# Patient Record
Sex: MALE | Race: WHITE | NOT HISPANIC OR LATINO | Employment: FULL TIME | URBAN - METROPOLITAN AREA
[De-identification: names, ages, dates, MRNs, and addresses within clinical notes are randomized per-mention and may not be internally consistent; named-entity substitution may affect disease eponyms.]

---

## 2020-11-30 ENCOUNTER — OFFICE VISIT (OUTPATIENT)
Dept: FAMILY MEDICINE | Facility: CLINIC | Age: 40
End: 2020-11-30
Payer: COMMERCIAL

## 2020-11-30 VITALS
WEIGHT: 172.2 LBS | OXYGEN SATURATION: 98 % | BODY MASS INDEX: 24.65 KG/M2 | RESPIRATION RATE: 16 BRPM | SYSTOLIC BLOOD PRESSURE: 120 MMHG | TEMPERATURE: 97.9 F | DIASTOLIC BLOOD PRESSURE: 76 MMHG | HEART RATE: 83 BPM | HEIGHT: 70 IN

## 2020-11-30 DIAGNOSIS — Z13.220 SCREENING CHOLESTEROL LEVEL: ICD-10-CM

## 2020-11-30 DIAGNOSIS — K92.1 BLOOD IN STOOL: ICD-10-CM

## 2020-11-30 DIAGNOSIS — R10.11 RUQ ABDOMINAL PAIN: ICD-10-CM

## 2020-11-30 DIAGNOSIS — R07.9 CHEST PAIN, UNSPECIFIED TYPE: Primary | ICD-10-CM

## 2020-11-30 DIAGNOSIS — Z13.1 SCREENING FOR DIABETES MELLITUS: ICD-10-CM

## 2020-11-30 DIAGNOSIS — F43.21 SITUATIONAL DEPRESSION: ICD-10-CM

## 2020-11-30 LAB
ALBUMIN SERPL-MCNC: 4.3 G/DL (ref 3.4–5)
ALP SERPL-CCNC: 61 U/L (ref 40–150)
ALT SERPL W P-5'-P-CCNC: 33 U/L (ref 0–70)
ANION GAP SERPL CALCULATED.3IONS-SCNC: 7 MMOL/L (ref 3–14)
AST SERPL W P-5'-P-CCNC: 16 U/L (ref 0–45)
BASOPHILS # BLD AUTO: 0 10E9/L (ref 0–0.2)
BASOPHILS NFR BLD AUTO: 0.2 %
BILIRUB SERPL-MCNC: 0.7 MG/DL (ref 0.2–1.3)
BUN SERPL-MCNC: 7 MG/DL (ref 7–30)
CALCIUM SERPL-MCNC: 9.4 MG/DL (ref 8.5–10.1)
CHLORIDE SERPL-SCNC: 105 MMOL/L (ref 94–109)
CHOLEST SERPL-MCNC: 157 MG/DL
CO2 SERPL-SCNC: 25 MMOL/L (ref 20–32)
CREAT SERPL-MCNC: 0.8 MG/DL (ref 0.66–1.25)
DIFFERENTIAL METHOD BLD: NORMAL
EOSINOPHIL # BLD AUTO: 0.1 10E9/L (ref 0–0.7)
EOSINOPHIL NFR BLD AUTO: 0.8 %
ERYTHROCYTE [DISTWIDTH] IN BLOOD BY AUTOMATED COUNT: 11.9 % (ref 10–15)
GFR SERPL CREATININE-BSD FRML MDRD: >90 ML/MIN/{1.73_M2}
GLUCOSE SERPL-MCNC: 91 MG/DL (ref 70–99)
HCT VFR BLD AUTO: 46.7 % (ref 40–53)
HDLC SERPL-MCNC: 50 MG/DL
HGB BLD-MCNC: 15.7 G/DL (ref 13.3–17.7)
LDLC SERPL CALC-MCNC: 83 MG/DL
LYMPHOCYTES # BLD AUTO: 1.7 10E9/L (ref 0.8–5.3)
LYMPHOCYTES NFR BLD AUTO: 18.3 %
MCH RBC QN AUTO: 32.2 PG (ref 26.5–33)
MCHC RBC AUTO-ENTMCNC: 33.6 G/DL (ref 31.5–36.5)
MCV RBC AUTO: 96 FL (ref 78–100)
MONOCYTES # BLD AUTO: 0.8 10E9/L (ref 0–1.3)
MONOCYTES NFR BLD AUTO: 8.8 %
NEUTROPHILS # BLD AUTO: 6.5 10E9/L (ref 1.6–8.3)
NEUTROPHILS NFR BLD AUTO: 71.9 %
NONHDLC SERPL-MCNC: 107 MG/DL
PLATELET # BLD AUTO: 308 10E9/L (ref 150–450)
POTASSIUM SERPL-SCNC: 3.8 MMOL/L (ref 3.4–5.3)
PROT SERPL-MCNC: 7.4 G/DL (ref 6.8–8.8)
RBC # BLD AUTO: 4.87 10E12/L (ref 4.4–5.9)
SODIUM SERPL-SCNC: 137 MMOL/L (ref 133–144)
TRIGL SERPL-MCNC: 118 MG/DL
WBC # BLD AUTO: 9 10E9/L (ref 4–11)

## 2020-11-30 PROCEDURE — 99203 OFFICE O/P NEW LOW 30 MIN: CPT | Performed by: FAMILY MEDICINE

## 2020-11-30 PROCEDURE — 80061 LIPID PANEL: CPT | Performed by: FAMILY MEDICINE

## 2020-11-30 PROCEDURE — 85025 COMPLETE CBC W/AUTO DIFF WBC: CPT | Performed by: FAMILY MEDICINE

## 2020-11-30 PROCEDURE — 93000 ELECTROCARDIOGRAM COMPLETE: CPT | Performed by: FAMILY MEDICINE

## 2020-11-30 PROCEDURE — 36415 COLL VENOUS BLD VENIPUNCTURE: CPT | Performed by: FAMILY MEDICINE

## 2020-11-30 PROCEDURE — 80053 COMPREHEN METABOLIC PANEL: CPT | Performed by: FAMILY MEDICINE

## 2020-11-30 ASSESSMENT — PATIENT HEALTH QUESTIONNAIRE - PHQ9: SUM OF ALL RESPONSES TO PHQ QUESTIONS 1-9: 19

## 2020-11-30 ASSESSMENT — MIFFLIN-ST. JEOR: SCORE: 1693.37

## 2020-11-30 NOTE — PROGRESS NOTES
Subjective     Tr Cuenca is a 40 year old male who presents to clinic today for the following health issues:    HPI   Abdominal/Flank Pain  Onset/Duration: 4 years for abdominal pain, blood in stool for 10 years  Description:   Character: Dull ache all the time and Stabbing occasional  Location: right upper quadrant  Radiation: None  Intensity: 5/10  Progression of Symptoms:  same  Accompanying Signs & Symptoms:  Fever/Chills: no  Gas/Bloating: no  Nausea: no  Vomitting: no  Diarrhea: YES  Constipation: no  Dysuria or Hematuria: no  Has had blood in the stool, bright red blood, occurs occasional, not with every bowel movement, does have some dark stools, blood on the toilet paper and in the stool, no rectal pain.    History:   Trauma: no  Previous similar pain: ongoing issue  Previous tests done: none  Precipitating factors:   Does the pain change with:     Food: no    Bowel Movement: no    Urination: no   Other factors:  no  Therapies tried and outcome: None    - Reports having blood incorporated in the stool over the last 10 years. Also blood in the toilet. This has since stopped after he stopped drinking alcohol, was drinking 4-6 beers nightly. This is his first time being evaluated for this. No family history of early cancer.     Chest Pain  Onset/Duration: 2 years, constant  Description:   Location: left side  Character: sharp and achey  Radiation: down left arm  Duration: constant   Intensity: 5/10  Progression of Symptoms: worsening  Accompanying Signs & Symptoms:  Shortness of breath: no  Sweating: YES- did have one episode about 1 year ago while raking leaves  Nausea/vomiting: no  Lightheadedness: no  Palpitations: maybe  Fever/Chills: no  Cough: no           Heartburn: unsure  History:   Family history of heart disease: YES- mother side  Tobacco use: uses e cig currently, was previous smoker  Previous similar symptoms: YES- this has been ongoing for 2 years  Precipitating factors:   Worse with  "exertion: no  Worse with deep breaths: no           Related to eating: no           Better with burping: no  Alleviating factors: none  Therapies tried and outcome: none    - Is not associated with activity. Is constant. Is reproducible with palpation over the chest wall.     MOOD:  -Patient reports that his wife recently left him 2 weeks ago.  He also has 2 boys ages 4 and 2.  He is willing to try counseling, and marriage therapy however his wife is not interested at this time.  Has been very difficult and he has had decreased appetite over the last few weeks.  He is not currently seeing a mental health therapist.  He is going to anger management and also has stopped drinking alcohol.  He is not interested in starting medication at this time for his mental health but is willing to meet with a therapist.    Review of Systems   Constitutional, HEENT, cardiovascular, pulmonary, gi and gu systems are negative, except as otherwise noted.      Objective    /76 (BP Location: Left arm, Patient Position: Chair, Cuff Size: Adult Regular)   Pulse 83   Temp 97.9  F (36.6  C) (Tympanic)   Resp 16   Ht 1.772 m (5' 9.75\")   Wt 78.1 kg (172 lb 3.2 oz)   SpO2 98%   BMI 24.89 kg/m    Body mass index is 24.89 kg/m .  Physical Exam   General: alert, cooperative, no acute distress   HEENT: NC/ AT, PERRL  CV: RRR, no murmur  Chest wall: Patient tender palpation to the left pectoralis major muscle.  Resp: non-labored breathing, clear to auscultation, no wheezing or rales   Abdomen: Soft, tender to palpation right upper quadrant.  Meneses sign positive.  No rebound or guarding.  Extremities: No peripheral edema, calves non-tender.           Assessment & Plan     Tr was seen today for rectal problem, abdominal pain and chest pain.    Diagnoses and all orders for this visit:    Chest pain, unspecified type  Musculoskeletal chest pain.  -Pain is not worsened with activity.  Pain is reproducible with palpation of the left " pectoralis major muscle.  No issues with breathing.  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 Will obtain baseline ECG as patient is concerned as uncle with heart attack and had chest pain.   -     EKG 12-lead complete w/read - Clinics  -     CBC with platelets differential    RUQ abdominal pain  Blood in stool   -Reports blood in his stool for the past 10 years.  He was drinking 4-6 beers nightly.  Since he has stopped drinking 3 weeks ago he no longer had any episodes of blood in his stool.  I do believe that alcohol was likely a culprit.  Is reassuring that he no longer has any blood in his stool.  Initial evaluation with a CBC, CMP and also ultrasound the right upper quadrant due to positive Meneses sign and right upper quadrant pain. He will follow up with me in 1 week to discuss labs and imaging. Will further discuss the need for a colonoscopy.   -     Comprehensive metabolic panel  -     US Abdomen Limited; Future    Situational depression  -His wife recently left him and also took his 2 children.  He is currently quite depressed.  He is not interested in medication at this time.  He is willing to proceed with mental health therapy and this was ordered today.  No plan to attempt suicide.  Safety plan discussed.  Follow up with myself in 1 week or sooner if needed.  Reasons to present to the emergency department discussed.  PHQ-9 score:    PHQ 11/30/2020   PHQ-9 Total Score 19   Q9: Thoughts of better off dead/self-harm past 2 weeks Several days     -     MENTAL HEALTH REFERRAL  - Adult; Outpatient Treatment; Individual/Couples/Family/Group Therapy/Health Psychology; [facility] [phone]; We will contact you to schedule the appointment or please call with any questions    Screening cholesterol level  - Has never been screened. Will obtain screening today.   -     Lipid panel reflex to direct LDL Fasting        Follow up in 1 week.     [facility]  [facility]

## 2020-11-30 NOTE — RESULT ENCOUNTER NOTE
Please call patient     ECG, cholesterol, CBC ( blood count), CMP ( abdominal labs) has returned satisfactory and within normal range. Continue with plan for follow up in 1 week.     Thanks

## 2020-12-07 ENCOUNTER — OFFICE VISIT (OUTPATIENT)
Dept: FAMILY MEDICINE | Facility: CLINIC | Age: 40
End: 2020-12-07
Payer: COMMERCIAL

## 2020-12-07 VITALS
TEMPERATURE: 96.9 F | BODY MASS INDEX: 24.34 KG/M2 | OXYGEN SATURATION: 98 % | HEART RATE: 83 BPM | SYSTOLIC BLOOD PRESSURE: 100 MMHG | DIASTOLIC BLOOD PRESSURE: 60 MMHG | HEIGHT: 70 IN | WEIGHT: 170 LBS | RESPIRATION RATE: 16 BRPM

## 2020-12-07 DIAGNOSIS — R10.9 FLANK PAIN: Primary | ICD-10-CM

## 2020-12-07 DIAGNOSIS — F43.21 SITUATIONAL DEPRESSION: ICD-10-CM

## 2020-12-07 DIAGNOSIS — F41.8 SITUATIONAL ANXIETY: ICD-10-CM

## 2020-12-07 PROCEDURE — 99213 OFFICE O/P EST LOW 20 MIN: CPT | Performed by: FAMILY MEDICINE

## 2020-12-07 ASSESSMENT — PATIENT HEALTH QUESTIONNAIRE - PHQ9
5. POOR APPETITE OR OVEREATING: SEVERAL DAYS
SUM OF ALL RESPONSES TO PHQ QUESTIONS 1-9: 10

## 2020-12-07 ASSESSMENT — ANXIETY QUESTIONNAIRES
1. FEELING NERVOUS, ANXIOUS, OR ON EDGE: SEVERAL DAYS
3. WORRYING TOO MUCH ABOUT DIFFERENT THINGS: MORE THAN HALF THE DAYS
IF YOU CHECKED OFF ANY PROBLEMS ON THIS QUESTIONNAIRE, HOW DIFFICULT HAVE THESE PROBLEMS MADE IT FOR YOU TO DO YOUR WORK, TAKE CARE OF THINGS AT HOME, OR GET ALONG WITH OTHER PEOPLE: SOMEWHAT DIFFICULT
5. BEING SO RESTLESS THAT IT IS HARD TO SIT STILL: SEVERAL DAYS
7. FEELING AFRAID AS IF SOMETHING AWFUL MIGHT HAPPEN: MORE THAN HALF THE DAYS
6. BECOMING EASILY ANNOYED OR IRRITABLE: NOT AT ALL
2. NOT BEING ABLE TO STOP OR CONTROL WORRYING: NEARLY EVERY DAY
GAD7 TOTAL SCORE: 10

## 2020-12-07 ASSESSMENT — MIFFLIN-ST. JEOR: SCORE: 1683.39

## 2020-12-07 NOTE — PROGRESS NOTES
"Subjective     Tr Cuenca is a 40 year old male who presents to clinic today for the following health issues:    HPI     Abdominal/flank pain:  Discussed most recent lab results with a normal CMP.  Patient is scheduled to get ultrasound of right upper quadrant looking for gallbladder etiology in the next few days.  Continues to have intermittent pain in the right upper quadrant.  No pain at today's visit.  No blood in the stool.     Chest pain:  Most recent ECG returned satisfactory.  Denies any chest pain at visit.  Denies any palpitations.  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     Mood:  Patient continues to have mood concerns.  He is in a tough situation for his wife left him with [place] boys [place] another albert.  Legal papers were filed on Tuesday his wife wants full custody of the children, he has been in contact with  and he will be filing for the same.  He reports that he is taking it day by day but it is very tough on him.  He is currently seeing anger management therapist, and a marriage therapist through the [facility].  He also reached out to a friend of his who does QNRT ( [employer]um neuro reset therapy) and going to see her in a couple days. His wife still does not want to pursue any couples therapy at this time. His PHQ 9 score decreased from 19 one week ago to 10 today.  He continues to have suicidal ideation.  He states that he would never do this especially with his boys.  He voices safety plan today.  Discussed with patient role of medications and he is still resistant to starting medications at this time.  No alcohol or substance abuse.    Review of Systems   Constitutional, HEENT, cardiovascular, pulmonary, gi and gu systems are negative, except as otherwise noted.      Objective    /60 (BP Location: Left arm, Patient Position: Chair, Cuff Size: Adult Regular)   Pulse 83   Temp 96.9  F (36.1  C) (Tympanic)   Resp 16   Ht 1.772 m (5' 9.75\")   Wt 77.1 kg (170 " lb)   SpO2 98%   BMI 24.57 kg/m    Body mass index is 24.57 kg/m .  Physical Exam   General: alert, cooperative   CV: RRR, no murmur  Resp: non-labored breathing  Abdomen: Soft, non-tender, no guarding.   Psych: mood seems down. No tangential thoughts. Good eye contact. Tearful at times. No hallucinations.           Assessment & Plan     Tr was seen today for abdominal pain.    Diagnoses and all orders for this visit:    Situational depression  -Continues to have situational depression which I am not surprised by given his current situation.   -He is currently seeing a therapist to [facility], going to anger management, and is now going to see a QNRT ( quantau neuro reset therapy) therapist.   - Continues to decline any medications at this time.   - On response stated positive for Q9. Discussed this with patient. He has no plan. He would never hurt himself or anyone else. Discussed if these thoughts become overwhelming he will present to the ED or call 911.   - follow up with myself in 2 weeks.     Situational anxiety  SANDOVAL 7 score of 10. Not interested in medication. Going through therapy at this time.     Flank Pain  No current flank pain.  Most recent CMP results satisfactory.  Scheduled for ultrasound in a few days.  His pain could be related to a cholelithiasis type picture    Follow up in 2 weeks with myself.         Depression Screening Follow Up    PHQ 12/7/2020   PHQ-9 Total Score 10   Q9: Thoughts of better off dead/self-harm past 2 weeks More than half the days     Follow Up    Discussed the following ways the patient can remain in a safe environment:  remove alcohol and remove drugs Call 911, present to ED.     Russ Chambers DO  [facility]

## 2020-12-07 NOTE — PATIENT INSTRUCTIONS
Lab work looks great     Follow up in 2 weeks.         Thank you for choosing [facility].  You may be receiving an email and/or telephone survey request from [facility] Customer Experience regarding your visit today.  Please take a few minutes to respond to the survey to let us know how we are doing.      If you have questions or concerns, please contact us via [email or web address] or you can contact your care team at [phone].    Our Clinic hours are:  Monday 6:40 am  to 7:00 pm  Tuesday -Friday 6:40 am to 5:00 pm    The [place] outpatient lab hours are:  Monday - Friday 6:10 am to 4:45 pm  Saturdays 7:00 am to 11:00 am  Appointments are required, call [phone]    If you have clinical questions after hours or would like to schedule an appointment,  call the clinic at [phone].

## 2020-12-08 ASSESSMENT — ANXIETY QUESTIONNAIRES: GAD7 TOTAL SCORE: 10

## 2020-12-09 ENCOUNTER — HOSPITAL ENCOUNTER (OUTPATIENT)
Dept: ULTRASOUND IMAGING | Facility: CLINIC | Age: 40
Discharge: HOME | End: 2020-12-09
Attending: FAMILY MEDICINE | Admitting: FAMILY MEDICINE
Payer: COMMERCIAL

## 2020-12-09 DIAGNOSIS — R10.11 RUQ ABDOMINAL PAIN: ICD-10-CM

## 2020-12-09 PROCEDURE — 76705 ECHO EXAM OF ABDOMEN: CPT

## 2020-12-09 NOTE — RESULT ENCOUNTER NOTE
Please call patient     Most recent US shows no gallstones and was otherwise within normal limits.

## 2020-12-21 ENCOUNTER — OFFICE VISIT (OUTPATIENT)
Dept: FAMILY MEDICINE | Facility: CLINIC | Age: 40
End: 2020-12-21
Payer: COMMERCIAL

## 2020-12-21 VITALS
TEMPERATURE: 96.7 F | SYSTOLIC BLOOD PRESSURE: 100 MMHG | WEIGHT: 173 LBS | RESPIRATION RATE: 16 BRPM | HEART RATE: 70 BPM | OXYGEN SATURATION: 97 % | DIASTOLIC BLOOD PRESSURE: 50 MMHG | BODY MASS INDEX: 24.77 KG/M2 | HEIGHT: 70 IN

## 2020-12-21 DIAGNOSIS — K64.9 HEMORRHOIDS, UNSPECIFIED HEMORRHOID TYPE: ICD-10-CM

## 2020-12-21 DIAGNOSIS — F41.8 SITUATIONAL ANXIETY: ICD-10-CM

## 2020-12-21 DIAGNOSIS — F43.21 SITUATIONAL DEPRESSION: Primary | ICD-10-CM

## 2020-12-21 PROCEDURE — 99213 OFFICE O/P EST LOW 20 MIN: CPT | Performed by: FAMILY MEDICINE

## 2020-12-21 RX ORDER — BENZOCAINE/MENTHOL 6 MG-10 MG
LOZENGE MUCOUS MEMBRANE 2 TIMES DAILY
Qty: 45 G | Refills: 3 | Status: SHIPPED | OUTPATIENT
Start: 2020-12-21 | End: 2021-01-11

## 2020-12-21 ASSESSMENT — PATIENT HEALTH QUESTIONNAIRE - PHQ9
SUM OF ALL RESPONSES TO PHQ QUESTIONS 1-9: 7
5. POOR APPETITE OR OVEREATING: SEVERAL DAYS

## 2020-12-21 ASSESSMENT — ANXIETY QUESTIONNAIRES
5. BEING SO RESTLESS THAT IT IS HARD TO SIT STILL: SEVERAL DAYS
6. BECOMING EASILY ANNOYED OR IRRITABLE: SEVERAL DAYS
1. FEELING NERVOUS, ANXIOUS, OR ON EDGE: SEVERAL DAYS
GAD7 TOTAL SCORE: 8
7. FEELING AFRAID AS IF SOMETHING AWFUL MIGHT HAPPEN: SEVERAL DAYS
3. WORRYING TOO MUCH ABOUT DIFFERENT THINGS: SEVERAL DAYS
IF YOU CHECKED OFF ANY PROBLEMS ON THIS QUESTIONNAIRE, HOW DIFFICULT HAVE THESE PROBLEMS MADE IT FOR YOU TO DO YOUR WORK, TAKE CARE OF THINGS AT HOME, OR GET ALONG WITH OTHER PEOPLE: SOMEWHAT DIFFICULT
2. NOT BEING ABLE TO STOP OR CONTROL WORRYING: MORE THAN HALF THE DAYS

## 2020-12-21 ASSESSMENT — MIFFLIN-ST. JEOR: SCORE: 1697

## 2020-12-21 NOTE — PATIENT INSTRUCTIONS
Continue with current cares.     Preparation H twice daily.     Follow up in 3-4 weeks.       Thank you for choosing [facility].  You may be receiving an email and/or telephone survey request from [facility] Customer Experience regarding your visit today.  Please take a few minutes to respond to the survey to let us know how we are doing.      If you have questions or concerns, please contact us via [employer] or you can contact your care team at [phone].    Our Clinic hours are:  Monday 6:40 am  to 7:00 pm  Tuesday -Friday 6:40 am to 5:00 pm    The [place] outpatient lab hours are:  Monday - Friday 6:10 am to 4:45 pm  Saturdays 7:00 am to 11:00 am  Appointments are required, call [phone]    If you have clinical questions after hours or would like to schedule an appointment,  call the clinic at [phone].

## 2020-12-21 NOTE — PROGRESS NOTES
"Subjective     Tr Cuenca is a 40 year old male who presents to clinic today for the following health issues:    HPI     Mood:  Mood has improved since last visit a couple weeks ago.  He continues to proceed with QNRT  therapy which he reports is going well.  Continues to have marital discord and will proceed with legal garcía with his current wife for child custody.  Continues to avoid alcohol and substances.  He reports that time has helped but is still in a tough situation.  He continues to see his children from time to time.      Blood in stool:  Patient reports that he has had blood when he wipes after a bowel movement and also occasionally in the toilet bowl, this will occur about every 3rd bowel movement. No changes in stool pattern. Blood is not incorporated into the stool. No pain with bowel movements. No straining.  No fevers or chills. No nausea or vomiting.  No unexpected weight loss.  He had prior episodes of blood in the stool when drinking alcohol however this subsided with cessation of alcohol.   No family history of early colon cancer.  Has never underwent a colonoscopy.             Review of Systems   Constitutional: Negative fevers   HEENT: Negative vision changes, hearing changes, difficulty with swallowing.  CV: Negative chest pain   Resp: Negative shortness of breath, significant cough, wheezing  GI: Negative nausea, vomiting, diarrhea, constipation, blood in stool        Objective    /50 (BP Location: Left arm, Patient Position: Chair, Cuff Size: Adult Regular)   Pulse 70   Temp 96.7  F (35.9  C) (Tympanic)   Resp 16   Ht 1.772 m (5' 9.75\")   Wt 78.5 kg (173 lb)   SpO2 97%   BMI 25.00 kg/m    Body mass index is 25 kg/m .  Physical Exam   General: alert, cooperative, no acute distress   CV: RRR, no murmur  Resp: non-labored breathing, clear to auscultation, no wheezing or rales   Abdomen: Soft, mild tenderness RLQ. No rebound or guarding. Meneses sign negative.   Rectal: exam " "deferred by patient.   Psych: Mood appropriate.  No tangential thoughts.  Affect seems improved since last visit.           Assessment & Plan     Tr was seen today for anxiety and depression.    Diagnoses and all orders for this visit:    Situational depression  Situational anxiety  -PHQ-9 score of 7, SANDOVAL-7 score of 8.  Mood is improved. No substance abuse. Defers any medication or other therapy. Will continue with QNRT therapy. To follow-up in 2 to 3 weeks. Will see sooner if needed.     Hemorrhoids, unspecified hemorrhoid type  - Bright red blood when wipes. Not incorporated into the stool. Will trial steroid cream to see if improvement. If no improvement at follow up visit will proceed with rectal exam and also likely colonoscopy.   -     hydrocortisone (CORTAID) 1 % external cream; Apply topically 2 times daily         BMI:   Estimated body mass index is 25 kg/m  as calculated from the following:    Height as of this encounter: 1.772 m (5' 9.75\").    Weight as of this encounter: 78.5 kg (173 lb).        Follow up in 2-3 weeks.     Russ Chambers, [facility]      "

## 2020-12-22 ASSESSMENT — ANXIETY QUESTIONNAIRES: GAD7 TOTAL SCORE: 8

## 2021-01-15 ENCOUNTER — HEALTH MAINTENANCE LETTER (OUTPATIENT)
Age: 41
End: 2021-01-15

## 2021-01-19 ENCOUNTER — VIRTUAL VISIT (OUTPATIENT)
Dept: PSYCHOLOGY | Facility: CLINIC | Age: 41
End: 2021-01-19
Attending: FAMILY MEDICINE
Payer: COMMERCIAL

## 2021-01-19 DIAGNOSIS — Z03.89 NO DIAGNOSIS ON AXIS I: Primary | ICD-10-CM

## 2021-01-19 NOTE — PROGRESS NOTES
Patient no showed to the session.  Contacted patient and left voicemail with contact information.

## 2021-01-19 NOTE — Clinical Note
Hello,     Just updating in regards of patient. We were set up for therapy appointment this morning.  Patient no showed, called twice and left voicemail with contact information.  Will follow up soon.

## 2021-02-15 ENCOUNTER — OFFICE VISIT (OUTPATIENT)
Dept: FAMILY MEDICINE | Facility: CLINIC | Age: 41
End: 2021-02-15
Payer: COMMERCIAL

## 2021-02-15 VITALS
OXYGEN SATURATION: 99 % | TEMPERATURE: 97 F | HEIGHT: 70 IN | WEIGHT: 172 LBS | RESPIRATION RATE: 16 BRPM | HEART RATE: 64 BPM | SYSTOLIC BLOOD PRESSURE: 104 MMHG | DIASTOLIC BLOOD PRESSURE: 60 MMHG | BODY MASS INDEX: 24.62 KG/M2

## 2021-02-15 DIAGNOSIS — F41.8 SITUATIONAL ANXIETY: ICD-10-CM

## 2021-02-15 DIAGNOSIS — F43.21 SITUATIONAL DEPRESSION: Primary | ICD-10-CM

## 2021-02-15 PROCEDURE — 99213 OFFICE O/P EST LOW 20 MIN: CPT | Performed by: FAMILY MEDICINE

## 2021-02-15 ASSESSMENT — ANXIETY QUESTIONNAIRES
6. BECOMING EASILY ANNOYED OR IRRITABLE: SEVERAL DAYS
GAD7 TOTAL SCORE: 4
2. NOT BEING ABLE TO STOP OR CONTROL WORRYING: SEVERAL DAYS
5. BEING SO RESTLESS THAT IT IS HARD TO SIT STILL: NOT AT ALL
1. FEELING NERVOUS, ANXIOUS, OR ON EDGE: SEVERAL DAYS
7. FEELING AFRAID AS IF SOMETHING AWFUL MIGHT HAPPEN: SEVERAL DAYS
3. WORRYING TOO MUCH ABOUT DIFFERENT THINGS: NOT AT ALL
IF YOU CHECKED OFF ANY PROBLEMS ON THIS QUESTIONNAIRE, HOW DIFFICULT HAVE THESE PROBLEMS MADE IT FOR YOU TO DO YOUR WORK, TAKE CARE OF THINGS AT HOME, OR GET ALONG WITH OTHER PEOPLE: NOT DIFFICULT AT ALL

## 2021-02-15 ASSESSMENT — PATIENT HEALTH QUESTIONNAIRE - PHQ9
5. POOR APPETITE OR OVEREATING: NOT AT ALL
SUM OF ALL RESPONSES TO PHQ QUESTIONS 1-9: 3

## 2021-02-15 ASSESSMENT — MIFFLIN-ST. JEOR: SCORE: 1695.41

## 2021-02-15 NOTE — PROGRESS NOTES
Assessment & Plan     Situational depression  Situational anxiety  - Overall is improving at this time. His wife has now moved back in with him and his children. Not going to pursue divorce but going to try and work through things. Currently going to group therapy with his wife. Declines any mood medication. No alcohol use.   Follow up in 1 month or sooner if needed.     Russ Chambers, [facility]    Ricky Armando is a 41 year old who presents for the following health issues     HPI   41 year old male who follows up for mood.    Anxiety Follow-Up    How are you doing with your anxiety since your last visit? Improved     Are you having other symptoms that might be associated with anxiety? No    Have you had a significant life event? Relationship Concerns     Are you feeling depressed? Yes:  little    Do you have any concerns with your use of alcohol or other drugs? No     Patient reports that he is doing better since last visit.  His wife has now moved back in.  They have decided to forego any divorce.  They are currently going through group therapy.  He is encouraging his wife to go to counseling individually as well.  He is happy with improvement over the last month.  Continues to decline any medications for help with mood. Continues to avoid any alcohol.     Social History     Tobacco Use     Smoking status: Former Smoker     Smokeless tobacco: Never Used     Tobacco comment: does use e cig   Substance Use Topics     Alcohol use: Not Currently     Drug use: Yes     Types: Marijuana     Comment: occasional     SANDOVAL-7 SCORE 12/21/2020 1/11/2021 2/15/2021   Total Score 8 7 4     PHQ 12/21/2020 1/11/2021 2/15/2021   PHQ-9 Total Score 7 7 3   Q9: Thoughts of better off dead/self-harm past 2 weeks Not at all Not at all Not at all         Review of Systems   Negative except as noted in HPI      Objective    /60 (BP Location: Left arm, Patient Position: Chair, Cuff Size: Adult  "Regular)   Pulse 64   Temp 97  F (36.1  C) (Tympanic)   Resp 16   Ht 1.784 m (5' 10.25\")   Wt 78 kg (172 lb)   SpO2 99%   BMI 24.50 kg/m    Body mass index is 24.5 kg/m .  Physical Exam   General: alert, cooperative, no acute distress   CV: RRR, no murmur  Resp: non-labored breathing, clear to auscultation, no wheezing or rales   Psych: mood appropriate, judgment appropriate. No tangential thoughts.       "

## 2021-02-15 NOTE — PATIENT INSTRUCTIONS
Follow up in 1 month.       Thank you for choosing [facility].  You may be receiving an email and/or telephone survey request from [facility] Customer Experience regarding your visit today.  Please take a few minutes to respond to the survey to let us know how we are doing.      If you have questions or concerns, please contact us via [email or web address] or you can contact your care team at [phone].    Our Clinic hours are:  Monday 6:40 am  to 7:00 pm  Tuesday -Friday 6:40 am to 5:00 pm    The [place] outpatient lab hours are:  Monday - Friday 6:10 am to 4:45 pm  Saturdays 7:00 am to 11:00 am  Appointments are required, call [phone]    If you have clinical questions after hours or would like to schedule an appointment,  call the clinic at [phone].

## 2021-02-16 ASSESSMENT — ANXIETY QUESTIONNAIRES: GAD7 TOTAL SCORE: 4

## 2021-04-06 ENCOUNTER — OFFICE VISIT (OUTPATIENT)
Dept: FAMILY MEDICINE | Facility: CLINIC | Age: 41
End: 2021-04-06
Payer: COMMERCIAL

## 2021-04-06 VITALS
TEMPERATURE: 96.4 F | WEIGHT: 174 LBS | HEART RATE: 74 BPM | OXYGEN SATURATION: 98 % | RESPIRATION RATE: 16 BRPM | SYSTOLIC BLOOD PRESSURE: 120 MMHG | HEIGHT: 70 IN | BODY MASS INDEX: 24.91 KG/M2 | DIASTOLIC BLOOD PRESSURE: 72 MMHG

## 2021-04-06 DIAGNOSIS — Z31.41 FERTILITY TESTING: Primary | ICD-10-CM

## 2021-04-06 PROCEDURE — 99213 OFFICE O/P EST LOW 20 MIN: CPT | Performed by: FAMILY MEDICINE

## 2021-04-06 ASSESSMENT — MIFFLIN-ST. JEOR: SCORE: 1704.48

## 2021-04-06 NOTE — PROGRESS NOTES
"    Assessment & Plan     Fertility testing  Patient to be evaluated by the Andrology lab. Semen analysis order placed. He was also requesting karyotyping and balance translocation. I discussed he should further discuss this at appointment at Andrology lab. If an order is needed for specific testing I would be happy to help out and instructed patient to write me a MyChart message if this is the case.   - Semen Analysis, Strict Morphology (SALLY); Future    Russ Chambers DO  [facility]    Ricky Armando is a 41 year old who presents for the following health issues     HPI     Pt is requesting an order for fertility. His wife has had 7 miscarriages and has seen a specialist and they are recommending some testing for him but needs order. Has two prior children with his wife.     Wife seeing a fertility doctor. Had another miscarriage recently. This is the 7th miscarriage. He is requesting to have karyotyping, balance translocation and sperm analysis completed at the request of specialist seeing his wife. Discussed he will need to present to the Andrology lab for next steps and further evaluation.     No recent illness. No testicular pain. No penile discharge or lesions.     Review of Systems   Constitutional, HEENT, cardiovascular, pulmonary, gi and gu systems are negative, except as otherwise noted.      Objective    /72 (BP Location: Left arm, Patient Position: Chair, Cuff Size: Adult Regular)   Pulse 74   Temp 96.4  F (35.8  C) (Tympanic)   Resp 16   Ht 1.784 m (5' 10.25\")   Wt 78.9 kg (174 lb)   SpO2 98%   BMI 24.79 kg/m    Body mass index is 24.79 kg/m .  Physical Exam   General: alert, cooperative, no acute distress   CV: RRR, no murmur  Resp: non-labored breathing, clear to auscultation, no wheezing or rales   Abdomen: Soft, non-tender, no guarding.   "

## 2021-04-06 NOTE — PATIENT INSTRUCTIONS
Schedule with Andrology lab for further evaluation.       Thank you for choosing [facility].  You may be receiving an email and/or telephone survey request from [facility] Customer Experience regarding your visit today.  Please take a few minutes to respond to the survey to let us know how we are doing.      If you have questions or concerns, please contact us via [email or web address] or you can contact your care team at [phone].    Our Clinic hours are:  Monday 6:40 am  to 7:00 pm  Tuesday -Friday 6:40 am to 5:00 pm    The [place] outpatient lab hours are:  Monday - Friday 6:10 am to 4:45 pm  Saturdays 7:00 am to 11:00 am  Appointments are required, call [phone]    If you have clinical questions after hours or would like to schedule an appointment,  call the clinic at [phone].

## 2021-04-14 ENCOUNTER — APPOINTMENT (OUTPATIENT)
Dept: LAB | Facility: CLINIC | Age: 41
End: 2021-04-14
Attending: FAMILY MEDICINE
Payer: COMMERCIAL

## 2021-04-14 DIAGNOSIS — Z31.41 FERTILITY TESTING: ICD-10-CM

## 2021-04-14 DIAGNOSIS — Z53.9 ERRONEOUS ENCOUNTER--DISREGARD: Primary | ICD-10-CM

## 2021-04-14 DIAGNOSIS — Z31.41 FERTILITY TESTING: Primary | ICD-10-CM

## 2021-04-14 PROCEDURE — 89322 SEMEN ANAL STRICT CRITERIA: CPT

## 2021-04-16 LAB
ABNORMAL SPERM: 92 MORPHOLOGY
ABSTINENCE DAYS: 4 DAYS (ref 2–7)
AGGLUTINATION: NO YES/NO
ANALYSIS TEMP - CENTIGRADE: 22.5 CENTIGRADE
CELL FRAGMENTS: NORMAL %
COLLECTION METHOD: NORMAL
COLLECTION SITE: NORMAL
CONSENT TO RELEASE TO PARTNER: YES
HEAD DEFECT: 92
IMMATURE SPERM: NORMAL %
IMMOTILE: 25 %
LAB RECEIPT TIME: NORMAL
LIQUEFIED: YES YES/NO
MIDPIECE DEFECT: 35
NON-PROGRESSIVE MOTILITY: 3 %
NORMAL SPERM: 8 % NORMAL FORMS (ref 4–?)
PROGRESSIVE MOTILITY: 72 % (ref 32–?)
ROUND CELLS: 0.1 MILLION/ML (ref ?–2)
SPECIMEN CONCENTRATION: 184 MILLION/ML (ref 15–?)
SPECIMEN PH: 7.6 PH (ref 7.2–?)
SPECIMEN TYPE: NORMAL
SPECIMEN VOL UR: 3.5 ML (ref 1.5–?)
TAIL DEFECT: 6
TIME OF ANALYSIS: NORMAL
TOTAL NUMBER: 644 MILLION (ref 39–?)
TOTAL PROGRESSIVE MOTILE: 464 MILLION (ref 15.6–?)
VISCOUS: NO YES/NO
VITALITY: NORMAL % (ref 58–?)
WBC SPECIMEN: NORMAL %

## 2021-04-28 DIAGNOSIS — Z31.41 FERTILITY TESTING: ICD-10-CM

## 2021-04-28 PROCEDURE — 88291 CYTO/MOLECULAR REPORT: CPT | Performed by: MEDICAL GENETICS

## 2021-05-12 LAB
COPATH REPORT: NORMAL
COPATH REPORT: NORMAL

## 2021-06-16 ENCOUNTER — HOSPITAL ENCOUNTER (EMERGENCY)
Facility: CLINIC | Age: 41
Discharge: HOME | End: 2021-06-16
Attending: PHYSICIAN ASSISTANT | Admitting: PHYSICIAN ASSISTANT
Payer: COMMERCIAL

## 2021-06-16 VITALS
RESPIRATION RATE: 16 BRPM | DIASTOLIC BLOOD PRESSURE: 91 MMHG | HEART RATE: 87 BPM | WEIGHT: 175 LBS | TEMPERATURE: 100.7 F | BODY MASS INDEX: 24.93 KG/M2 | OXYGEN SATURATION: 95 % | SYSTOLIC BLOOD PRESSURE: 168 MMHG

## 2021-06-16 DIAGNOSIS — R11.2 NAUSEA VOMITING AND DIARRHEA: ICD-10-CM

## 2021-06-16 DIAGNOSIS — E87.6 HYPOKALEMIA: ICD-10-CM

## 2021-06-16 DIAGNOSIS — R19.7 NAUSEA VOMITING AND DIARRHEA: ICD-10-CM

## 2021-06-16 LAB
ALBUMIN SERPL-MCNC: 3.3 G/DL (ref 3.4–5)
ALP SERPL-CCNC: 82 U/L (ref 40–150)
ALT SERPL W P-5'-P-CCNC: 22 U/L (ref 0–70)
ANION GAP SERPL CALCULATED.3IONS-SCNC: 15 MMOL/L (ref 3–14)
AST SERPL W P-5'-P-CCNC: 26 U/L (ref 0–45)
BASOPHILS # BLD AUTO: 0 10E9/L (ref 0–0.2)
BASOPHILS NFR BLD AUTO: 0.1 %
BILIRUB SERPL-MCNC: 1.2 MG/DL (ref 0.2–1.3)
BUN SERPL-MCNC: 12 MG/DL (ref 7–30)
CALCIUM SERPL-MCNC: 9.5 MG/DL (ref 8.5–10.1)
CHLORIDE SERPL-SCNC: 101 MMOL/L (ref 94–109)
CO2 SERPL-SCNC: 19 MMOL/L (ref 20–32)
CREAT SERPL-MCNC: 0.79 MG/DL (ref 0.66–1.25)
DIFFERENTIAL METHOD BLD: ABNORMAL
EOSINOPHIL # BLD AUTO: 0 10E9/L (ref 0–0.7)
EOSINOPHIL NFR BLD AUTO: 0 %
ERYTHROCYTE [DISTWIDTH] IN BLOOD BY AUTOMATED COUNT: 12.4 % (ref 10–15)
GFR SERPL CREATININE-BSD FRML MDRD: >90 ML/MIN/{1.73_M2}
GLUCOSE SERPL-MCNC: 85 MG/DL (ref 70–99)
HCT VFR BLD AUTO: 41.1 % (ref 40–53)
HGB BLD-MCNC: 14.1 G/DL (ref 13.3–17.7)
IMM GRANULOCYTES # BLD: 0.1 10E9/L (ref 0–0.4)
IMM GRANULOCYTES NFR BLD: 0.4 %
LABORATORY COMMENT REPORT: NORMAL
LYMPHOCYTES # BLD AUTO: 0.8 10E9/L (ref 0.8–5.3)
LYMPHOCYTES NFR BLD AUTO: 5.2 %
MCH RBC QN AUTO: 30.9 PG (ref 26.5–33)
MCHC RBC AUTO-ENTMCNC: 34.3 G/DL (ref 31.5–36.5)
MCV RBC AUTO: 90 FL (ref 78–100)
MONOCYTES # BLD AUTO: 0.6 10E9/L (ref 0–1.3)
MONOCYTES NFR BLD AUTO: 3.7 %
NEUTROPHILS # BLD AUTO: 14.4 10E9/L (ref 1.6–8.3)
NEUTROPHILS NFR BLD AUTO: 90.6 %
NRBC # BLD AUTO: 0 10*3/UL
NRBC BLD AUTO-RTO: 0 /100
PLATELET # BLD AUTO: 308 10E9/L (ref 150–450)
POTASSIUM SERPL-SCNC: 3.3 MMOL/L (ref 3.4–5.3)
PROT SERPL-MCNC: 8 G/DL (ref 6.8–8.8)
RBC # BLD AUTO: 4.56 10E12/L (ref 4.4–5.9)
SARS-COV-2 RNA RESP QL NAA+PROBE: NEGATIVE
SODIUM SERPL-SCNC: 135 MMOL/L (ref 133–144)
SPECIMEN SOURCE: NORMAL
WBC # BLD AUTO: 15.9 10E9/L (ref 4–11)

## 2021-06-16 PROCEDURE — C9803 HOPD COVID-19 SPEC COLLECT: HCPCS | Performed by: PHYSICIAN ASSISTANT

## 2021-06-16 PROCEDURE — 87635 SARS-COV-2 COVID-19 AMP PRB: CPT | Performed by: PHYSICIAN ASSISTANT

## 2021-06-16 PROCEDURE — 250N000013 HC RX MED GY IP 250 OP 250 PS 637: Performed by: PHYSICIAN ASSISTANT

## 2021-06-16 PROCEDURE — 99284 EMERGENCY DEPT VISIT MOD MDM: CPT | Performed by: PHYSICIAN ASSISTANT

## 2021-06-16 PROCEDURE — 258N000003 HC RX IP 258 OP 636: Performed by: PHYSICIAN ASSISTANT

## 2021-06-16 PROCEDURE — 99283 EMERGENCY DEPT VISIT LOW MDM: CPT | Mod: 25 | Performed by: PHYSICIAN ASSISTANT

## 2021-06-16 PROCEDURE — 80053 COMPREHEN METABOLIC PANEL: CPT | Performed by: PHYSICIAN ASSISTANT

## 2021-06-16 PROCEDURE — 96360 HYDRATION IV INFUSION INIT: CPT | Performed by: PHYSICIAN ASSISTANT

## 2021-06-16 PROCEDURE — 85025 COMPLETE CBC W/AUTO DIFF WBC: CPT | Performed by: PHYSICIAN ASSISTANT

## 2021-06-16 RX ORDER — ACETAMINOPHEN 325 MG/1
650 TABLET ORAL ONCE
Status: COMPLETED | OUTPATIENT
Start: 2021-06-16 | End: 2021-06-16

## 2021-06-16 RX ORDER — ONDANSETRON 4 MG/1
4 TABLET, ORALLY DISINTEGRATING ORAL EVERY 8 HOURS PRN
Qty: 10 TABLET | Refills: 0 | Status: SHIPPED | OUTPATIENT
Start: 2021-06-16 | End: 2021-06-19

## 2021-06-16 RX ORDER — SODIUM CHLORIDE 9 MG/ML
INJECTION, SOLUTION INTRAVENOUS CONTINUOUS
Status: DISCONTINUED | OUTPATIENT
Start: 2021-06-16 | End: 2021-06-16 | Stop reason: HOSPADM

## 2021-06-16 RX ADMIN — ACETAMINOPHEN 650 MG: 325 TABLET, FILM COATED ORAL at 13:23

## 2021-06-16 RX ADMIN — SODIUM CHLORIDE 1000 ML: 9 INJECTION, SOLUTION INTRAVENOUS at 13:18

## 2021-06-16 ASSESSMENT — ENCOUNTER SYMPTOMS
COUGH: 0
WOUND: 0
DIARRHEA: 1
ABDOMINAL PAIN: 0
BLOOD IN STOOL: 0
ANAL BLEEDING: 0
HEADACHES: 1
SHORTNESS OF BREATH: 0
VOMITING: 1
PALPITATIONS: 0
WHEEZING: 0
SORE THROAT: 0
COLOR CHANGE: 0
CHILLS: 1
LIGHT-HEADEDNESS: 1
DIAPHORESIS: 1
FEVER: 1
PHOTOPHOBIA: 0
DIZZINESS: 1
NAUSEA: 1

## 2021-06-16 NOTE — ED PROVIDER NOTES
History     Chief Complaint   Patient presents with     Nausea, Vomiting, & Diarrhea     4 days of n/v/d and pt is worried about dehydration, has had a low grade fever on Monday     HPI  Tr Cuenca is a 41 year old overall healthy male who presents to the emergency department with concern over 4-day history of illness.  Patient initially developed some chills, myalgias, low-grade temp 4 days prior to arrival.  He subsequently has developed couple episodes of vomiting and diarrhea up to 3 episodes of watery loose stools today.  He also complains of headache, lightheadedness and states his urine has been darker in color concerned about possible dehydration.  He has not had any nasal congestion, sore throat, cough, dyspnea, wheezing, chest pain, palpitations, abdominal pain, hematemesis, melena, hematochezia.  No dysuria, hematuria.  He has been pushing fluids, has not taken any OTC medications.  He denies any close contacts with GI symptoms.  No suspected bad food exposure.  No recent travel.  No recent antibiotic use.  He denies any known contacts with COVID-19.  He has not previously been vaccinated.  He denies any tick bites or exposures this year.     Allergies:  No Known Allergies    Problem List:    Patient Active Problem List    Diagnosis Date Noted     Situational depression 12/07/2020     Priority: Medium     Situational anxiety 12/07/2020     Priority: Medium     Past Medical History:    No past medical history on file.    Past Surgical History:    No past surgical history on file.    Family History:    No family history on file.    Social History:  Marital Status:   [2]  Social History     Tobacco Use     Smoking status: Former Smoker     Smokeless tobacco: Never Used     Tobacco comment: does use e cig   Substance Use Topics     Alcohol use: Not Currently     Drug use: Yes     Types: Marijuana     Comment: occasional      Medications:    No current outpatient medications on file.    Review of  Systems   Constitutional: Positive for chills, diaphoresis and fever.   HENT: Negative for congestion, ear pain and sore throat.    Eyes: Negative for photophobia and visual disturbance.   Respiratory: Negative for cough, shortness of breath and wheezing.    Cardiovascular: Negative for chest pain and palpitations.   Gastrointestinal: Positive for diarrhea, nausea and vomiting. Negative for abdominal pain, anal bleeding and blood in stool.   Skin: Negative for color change, rash and wound.   Neurological: Positive for dizziness, light-headedness and headaches.     Physical Exam   BP: (!) 168/91  Pulse: 87  Temp: 99.9  F (37.7  C)  Resp: 16  Weight: 79.4 kg (175 lb)  SpO2: 95 %  Physical Exam  GENERAL APPEARANCE: Alert, cooperative, nontoxic, diaphoretic  EYES: EOMI,  PERRL, conjunctiva clear  HENT: ear canals and TM's normal.  Nose and mouth without ulcers, erythema or lesions  NECK: supple, nontender, no lymphadenopathy  RESP: lungs clear to auscultation - no rales, rhonchi or wheezes  CV: regular rates and rhythm, normal S1 S2, no murmur noted  ABDOMEN:  soft, nontender, no HSM or masses and bowel sounds normal, no CVA tenderness  NEURO: Normal strength and tone, sensory exam grossly normal,  normal speech and mentation, CN III-XII grossly intact  SKIN: no suspicious lesions or rashes  ED Course        Procedures        Critical Care time:  none          Results for orders placed or performed during the hospital encounter of 06/16/21   CBC with platelets, differential     Status: Abnormal   Result Value Ref Range    WBC 15.9 (H) 4.0 - 11.0 10e9/L    RBC Count 4.56 4.4 - 5.9 10e12/L    Hemoglobin 14.1 13.3 - 17.7 g/dL    Hematocrit 41.1 40.0 - 53.0 %    MCV 90 78 - 100 fl    MCH 30.9 26.5 - 33.0 pg    MCHC 34.3 31.5 - 36.5 g/dL    RDW 12.4 10.0 - 15.0 %    Platelet Count 308 150 - 450 10e9/L    Diff Method Automated Method     % Neutrophils 90.6 %    % Lymphocytes 5.2 %    % Monocytes 3.7 %    % Eosinophils 0.0 %     % Basophils 0.1 %    % Immature Granulocytes 0.4 %    Nucleated RBCs 0 0 /100    Absolute Neutrophil 14.4 (H) 1.6 - 8.3 10e9/L    Absolute Lymphocytes 0.8 0.8 - 5.3 10e9/L    Absolute Monocytes 0.6 0.0 - 1.3 10e9/L    Absolute Eosinophils 0.0 0.0 - 0.7 10e9/L    Absolute Basophils 0.0 0.0 - 0.2 10e9/L    Abs Immature Granulocytes 0.1 0 - 0.4 10e9/L    Absolute Nucleated RBC 0.0    Comprehensive metabolic panel     Status: Abnormal   Result Value Ref Range    Sodium 135 133 - 144 mmol/L    Potassium 3.3 (L) 3.4 - 5.3 mmol/L    Chloride 101 94 - 109 mmol/L    Carbon Dioxide 19 (L) 20 - 32 mmol/L    Anion Gap 15 (H) 3 - 14 mmol/L    Glucose 85 70 - 99 mg/dL    Urea Nitrogen 12 7 - 30 mg/dL    Creatinine 0.79 0.66 - 1.25 mg/dL    GFR Estimate >90 >60 mL/min/[1.73_m2]    GFR Estimate If Black >90 >60 mL/min/[1.73_m2]    Calcium 9.5 8.5 - 10.1 mg/dL    Bilirubin Total 1.2 0.2 - 1.3 mg/dL    Albumin 3.3 (L) 3.4 - 5.0 g/dL    Protein Total 8.0 6.8 - 8.8 g/dL    Alkaline Phosphatase 82 40 - 150 U/L    ALT 22 0 - 70 U/L    AST 26 0 - 45 U/L   Symptomatic SARS-CoV-2 COVID-19 Virus (Coronavirus) by PCR     Status: None    Specimen: Nasopharyngeal   Result Value Ref Range    SARS-CoV-2 Virus Specimen Source Nasopharyngeal     SARS-CoV-2 PCR Result NEGATIVE     SARS-CoV-2 PCR Comment (Note)         Medications   0.9% sodium chloride BOLUS (has no administration in time range)     Followed by   sodium chloride 0.9% infusion (has no administration in time range)   acetaminophen (TYLENOL) tablet 650 mg (has no administration in time range)     Assessments & Plan (with Medical Decision Making)     I have reviewed the nursing notes.  I have reviewed the findings, diagnosis, plan and need for follow up with the patient.     New Prescriptions    No medications on file     Final diagnoses:   Nausea vomiting and diarrhea   Hypokalemia     41-year-old male presents to the emergency department with concern over 4-day history of chills,  myalgias, low-grade fever, vomiting, diarrhea and headache.  He initially had stable vital signs upon arrival however repeat temp at time of examination did show a low-grade fever at 100.7.  Physical exam findings included soft benign abdominal examination.  Nonfocal neurologic exam.  He did have laboratory testing including CBC significant for elevated WBC at 15.9 with left shift, CMP showed K+ of 3.3, CO2 19, anion gap 15, negative COVID-19 testing.  Given fever, headache, gastrointestinal symptoms and timing during summer months, I did discuss possibility of tickborne infection/benefits of obtaining additional labs to rule out tickborne illnesses which patient declined at this time stating he had no concern or suspicion for.  I do not suspect bacterial meningitis at this time and will defer LP.  Symptoms most likely secondary to viral illness. He was treated in the department with IV fluids.  He was discharged home stable with instructions to eat high potassium foods over the next several days.  Follow-up with primary care provider if no resolution of fever within the next 48 to 72 hours.  Worrisome reasons to return to the ER/UC sooner discussed.     Disclaimer: This note consists of symbols derived from keyboarding, dictation, and/or voice recognition software. As a result, there may be errors in the script that have gone undetected.  Please consider this when interpreting information found in the chart.    6/16/2021   [facility] EMERGENCY DEPT    Mima Mar PA-C  06/23/21 1946

## 2021-10-11 ENCOUNTER — HEALTH MAINTENANCE LETTER (OUTPATIENT)
Age: 41
End: 2021-10-11

## 2022-01-30 ENCOUNTER — HEALTH MAINTENANCE LETTER (OUTPATIENT)
Age: 42
End: 2022-01-30

## 2022-04-04 ENCOUNTER — OFFICE VISIT (OUTPATIENT)
Dept: FAMILY MEDICINE | Facility: CLINIC | Age: 42
End: 2022-04-04
Payer: COMMERCIAL

## 2022-04-04 VITALS
BODY MASS INDEX: 23.48 KG/M2 | RESPIRATION RATE: 16 BRPM | WEIGHT: 164 LBS | TEMPERATURE: 98 F | DIASTOLIC BLOOD PRESSURE: 60 MMHG | SYSTOLIC BLOOD PRESSURE: 120 MMHG | HEART RATE: 60 BPM | HEIGHT: 70 IN | OXYGEN SATURATION: 96 %

## 2022-04-04 DIAGNOSIS — Z22.39 CARRIER OF UREAPLASMA UREALYTICUM: ICD-10-CM

## 2022-04-04 DIAGNOSIS — N96 HISTORY OF RECURRENT MISCARRIAGES: ICD-10-CM

## 2022-04-04 DIAGNOSIS — Z86.39 HISTORY OF LOW POTASSIUM: ICD-10-CM

## 2022-04-04 DIAGNOSIS — Z00.00 ROUTINE GENERAL MEDICAL EXAMINATION AT A HEALTH CARE FACILITY: Primary | ICD-10-CM

## 2022-04-04 LAB
ANION GAP SERPL CALCULATED.3IONS-SCNC: 5 MMOL/L (ref 3–14)
BUN SERPL-MCNC: 8 MG/DL (ref 7–30)
CALCIUM SERPL-MCNC: 8.7 MG/DL (ref 8.5–10.1)
CHLORIDE BLD-SCNC: 105 MMOL/L (ref 94–109)
CO2 SERPL-SCNC: 27 MMOL/L (ref 20–32)
CREAT SERPL-MCNC: 0.73 MG/DL (ref 0.66–1.25)
ERYTHROCYTE [DISTWIDTH] IN BLOOD BY AUTOMATED COUNT: 12.8 % (ref 10–15)
GFR SERPL CREATININE-BSD FRML MDRD: >90 ML/MIN/1.73M2
GLUCOSE BLD-MCNC: 79 MG/DL (ref 70–99)
HCT VFR BLD AUTO: 42.7 % (ref 40–53)
HGB BLD-MCNC: 13.9 G/DL (ref 13.3–17.7)
MCH RBC QN AUTO: 31 PG (ref 26.5–33)
MCHC RBC AUTO-ENTMCNC: 32.6 G/DL (ref 31.5–36.5)
MCV RBC AUTO: 95 FL (ref 78–100)
PLATELET # BLD AUTO: 290 10E3/UL (ref 150–450)
POTASSIUM BLD-SCNC: 3.9 MMOL/L (ref 3.4–5.3)
RBC # BLD AUTO: 4.49 10E6/UL (ref 4.4–5.9)
SODIUM SERPL-SCNC: 137 MMOL/L (ref 133–144)
WBC # BLD AUTO: 8.4 10E3/UL (ref 4–11)

## 2022-04-04 PROCEDURE — 99213 OFFICE O/P EST LOW 20 MIN: CPT | Mod: 25 | Performed by: FAMILY MEDICINE

## 2022-04-04 PROCEDURE — 36415 COLL VENOUS BLD VENIPUNCTURE: CPT | Performed by: FAMILY MEDICINE

## 2022-04-04 PROCEDURE — 85027 COMPLETE CBC AUTOMATED: CPT | Performed by: FAMILY MEDICINE

## 2022-04-04 PROCEDURE — 80048 BASIC METABOLIC PNL TOTAL CA: CPT | Performed by: FAMILY MEDICINE

## 2022-04-04 PROCEDURE — 99396 PREV VISIT EST AGE 40-64: CPT | Performed by: FAMILY MEDICINE

## 2022-04-04 RX ORDER — DOXYCYCLINE HYCLATE 100 MG
100 TABLET ORAL 2 TIMES DAILY
Qty: 28 TABLET | Refills: 0 | Status: SHIPPED | OUTPATIENT
Start: 2022-04-04 | End: 2022-04-18

## 2022-04-04 ASSESSMENT — ENCOUNTER SYMPTOMS
HEARTBURN: 0
ABDOMINAL PAIN: 0
HEMATOCHEZIA: 0
COUGH: 0
CONSTIPATION: 0
PARESTHESIAS: 0
JOINT SWELLING: 0
DYSURIA: 0
CHILLS: 0
SHORTNESS OF BREATH: 0
MYALGIAS: 0
ARTHRALGIAS: 0
SORE THROAT: 0
FREQUENCY: 0
EYE PAIN: 0
DIZZINESS: 0
NERVOUS/ANXIOUS: 0
HEMATURIA: 0
PALPITATIONS: 0
WEAKNESS: 0
HEADACHES: 0
NAUSEA: 0
FEVER: 0
DIARRHEA: 0

## 2022-04-04 ASSESSMENT — PAIN SCALES - GENERAL: PAINLEVEL: NO PAIN (0)

## 2022-04-04 NOTE — PATIENT INSTRUCTIONS
Lab work today.     Doxycyline medication prescribed.     Follow up yearly or sooner as needed.     Follow up with nurse only visit for tetanus vaccine.           Preventive Health Recommendations  Male Ages 40 to 49    Yearly exam:             See your health care provider every year in order to  o   Review health changes.   o   Discuss preventive care.    o   Review your medicines if your doctor has prescribed any.    You should be tested each year for STDs (sexually transmitted diseases) if you re at risk.     Have a cholesterol test every 5 years.     Have a colonoscopy (test for colon cancer) if someone in your family has had colon cancer or polyps before age 50.     After age 45, have a diabetes test (fasting glucose). If you are at risk for diabetes, you should have this test every 3 years.      Talk with your health care provider about whether or not a prostate cancer screening test (PSA) is right for you.    Shots: Get a flu shot each year. Get a tetanus shot every 10 years.     Nutrition:    Eat at least 5 servings of fruits and vegetables daily.     Eat whole-grain bread, whole-wheat pasta and brown rice instead of white grains and rice.     Get adequate Calcium and Vitamin D.     Lifestyle    Exercise for at least 150 minutes a week (30 minutes a day, 5 days a week). This will help you control your weight and prevent disease.     Limit alcohol to one drink per day.     No smoking.     Wear sunscreen to prevent skin cancer.     See your dentist every six months for an exam and cleaning.

## 2022-04-04 NOTE — PROGRESS NOTES
SUBJECTIVE:   CC: Tr Cuenca is an 42 year old male who presents for preventative health visit.       Patient has been advised of split billing requirements and indicates understanding: Yes  Healthy Habits:     Getting at least 3 servings of Calcium per day:  Yes    Bi-annual eye exam:  NO    Dental care twice a year:  NO    Sleep apnea or symptoms of sleep apnea:  None    Diet:  Regular (no restrictions)    Frequency of exercise:  1 day/week    Duration of exercise:  15-30 minutes    Taking medications regularly:  Yes    Medication side effects:  None    PHQ-2 Total Score: 0    Additional concerns today:  No       Immunizations: due for tetanus, covid, flu. Deferred today   Cholesterol: checked 11/2020 satisfactory   Diabetes: glucose 91 fasting checked 11/2020   Colon Cancer: screen age 45.   HIV screen: deferred  Hepatitis C screen: deferred  Diet/Exercise: active at work   Tobacco: No tobacco use.     E.cig nicotine   No alcohol use.     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   Has had fertility testing in the past which was fine from his standpoint.  Wife is currently being treated for ureaplasma and he is requesting a doxycyline prescription.       Today's PHQ-2 Score:   PHQ-2 ( 1999 Pfizer) 4/4/2022   Q1: Little interest or pleasure in doing things 0   Q2: Feeling down, depressed or hopeless 0   PHQ-2 Score 0   PHQ-2 Total Score (12-17 Years)- Positive if 3 or more points; Administer PHQ-A if positive -   Q1: Little interest or pleasure in doing things Not at all   Q2: Feeling down, depressed or hopeless Not at all    PHQ-2 Score 0       Abuse: Current or Past(Physical, Sexual or Emotional)- No  Do you feel safe in your environment? Yes    Have you ever done Advance Care Planning? (For example, a Health Directive, POLST, or a discussion with a medical provider or your loved ones about your wishes): No, advance care planning information given to patient to review.  Patient declined advance care planning discussion at this time.    Social History     Tobacco Use     Smoking status: Former Smoker     Years: 20.00     Quit date: 2017     Years since quittin.0     Smokeless tobacco: Never Used     Tobacco comment: does use e cig   Substance Use Topics     Alcohol use: Not Currently     If you drink alcohol do you typically have >3 drinks per day or >7 drinks per week? No    Alcohol Use 2022   Prescreen: >3 drinks/day or >7 drinks/week? Not Applicable   Prescreen: >3 drinks/day or >7 drinks/week? -       Last PSA: No results found for: PSA    Reviewed orders with patient. Reviewed health maintenance and updated orders accordingly - Yes    Reviewed and updated as needed this visit by clinical staff   Tobacco  Allergies  Meds  Problems  Med Hx  Surg Hx  Fam Hx  Soc   Hx        Reviewed and updated as needed this visit by Provider   Tobacco  Allergies  Meds  Problems  Med Hx  Surg Hx  Fam Hx             Review of Systems   Constitutional: Negative for chills and fever.   HENT: Negative for congestion, ear pain, hearing loss and sore throat.    Eyes: Negative for pain and visual disturbance.   Respiratory: Negative for cough and shortness of breath.    Cardiovascular: Negative for chest pain, palpitations and peripheral edema.   Gastrointestinal: Negative for abdominal pain, constipation, diarrhea, heartburn, hematochezia and nausea.   Genitourinary: Negative for dysuria, frequency, genital sores, hematuria, impotence, penile discharge and urgency.   Musculoskeletal: Negative for arthralgias, joint swelling and  "myalgias.   Skin: Negative for rash.   Neurological: Negative for dizziness, weakness, headaches and paresthesias.   Psychiatric/Behavioral: Negative for mood changes. The patient is not nervous/anxious.        OBJECTIVE:   /60 (BP Location: Left arm, Patient Position: Chair, Cuff Size: Adult Regular)   Pulse 60   Temp 98  F (36.7  C) (Tympanic)   Resp 16   Ht 1.772 m (5' 9.75\")   Wt 74.4 kg (164 lb)   SpO2 96%   BMI 23.70 kg/m      Physical Exam  GENERAL: healthy, alert and no distress  EYES: Eyes grossly normal to inspection, PERRL and conjunctivae and sclerae normal  HENT: ear canals and TM's normal, nose and mouth without ulcers or lesions  NECK: no adenopathy, no asymmetry, masses, or scars and thyroid normal to palpation  RESP: lungs clear to auscultation - no rales, rhonchi or wheezes  CV: regular rate and rhythm, normal S1 S2, no S3 or S4, no murmur, click or rub, no peripheral edema and peripheral pulses strong  ABDOMEN: soft, nontender, no hepatosplenomegaly, no masses and bowel sounds normal  MS: no gross musculoskeletal defects noted, no edema  SKIN: no suspicious lesions or rashes  NEURO: Normal strength and tone, mentation intact and speech normal  PSYCH: mentation appears normal, affect normal/bright    ASSESSMENT/PLAN:   Tr was seen today for physical.    Diagnoses and all orders for this visit:    Routine general medical examination at a health care facility  Immunizations: due for tetanus, covid, flu. Deferred today   Cholesterol: checked 11/2020 satisfactory   Diabetes: glucose 91 fasting checked 11/2020   Colon Cancer: screen age 45.   HIV screen: deferred  Hepatitis C screen: deferred  Diet/Exercise: active at work   Tobacco: No tobacco use.     History of low potassium  Leukocytosis  Last potassium slightly low at 3.3 when in an acute illness. Will recheck to ensure resolved and normalized.   Slight leukocytosis as well. Will check to ensure normalization.   -     CBC with " "platelets  -     Basic metabolic panel  (Ca, Cl, CO2, Creat, Gluc, K, Na, BUN)    History of recurrent miscarriages   - Wife with ureaplasma and is being treated with doxycycline. Will provide patient with doxycycline to be take twice daily for 14 days.   -     doxycycline hyclate (VIBRA-TABS) 100 MG tablet; Take 1 tablet (100 mg) by mouth 2 times daily for 14 days    Other orders  -     REVIEW OF HEALTH MAINTENANCE PROTOCOL ORDERS        Patient has been advised of split billing requirements and indicates understanding: Yes by MA and AFR    COUNSELING:   Reviewed preventive health counseling, as reflected in patient instructions       Regular exercise       Healthy diet/nutrition       Consider Hep C screening for all patients one time for ages 18-79 years       HIV screeninx in teen years, 1x in adult years, and at intervals if high risk       Colorectal cancer screening    Estimated body mass index is 23.7 kg/m  as calculated from the following:    Height as of this encounter: 1.772 m (5' 9.75\").    Weight as of this encounter: 74.4 kg (164 lb).         He reports that he quit smoking about 5 years ago. He quit after 20.00 years of use. He has never used smokeless tobacco.      Counseling Resources:  ATP IV Guidelines  Pooled Cohorts Equation Calculator  FRAX Risk Assessment  ICSI Preventive Guidelines  Dietary Guidelines for Americans, 2010  USDA's MyPlate  ASA Prophylaxis  Lung CA Screening    Russ Chambers DO  [facility]  "

## 2022-08-10 ENCOUNTER — OFFICE VISIT (OUTPATIENT)
Dept: FAMILY MEDICINE | Facility: CLINIC | Age: 42
End: 2022-08-10
Payer: COMMERCIAL

## 2022-08-10 VITALS
OXYGEN SATURATION: 98 % | SYSTOLIC BLOOD PRESSURE: 102 MMHG | DIASTOLIC BLOOD PRESSURE: 70 MMHG | HEART RATE: 70 BPM | WEIGHT: 146 LBS | BODY MASS INDEX: 20.9 KG/M2 | HEIGHT: 70 IN | RESPIRATION RATE: 16 BRPM | TEMPERATURE: 96.8 F

## 2022-08-10 DIAGNOSIS — R59.1 LYMPHADENOPATHY: ICD-10-CM

## 2022-08-10 DIAGNOSIS — R63.4 UNEXPLAINED WEIGHT LOSS: Primary | ICD-10-CM

## 2022-08-10 LAB
ALBUMIN SERPL-MCNC: 4 G/DL (ref 3.4–5)
ALBUMIN UR-MCNC: NEGATIVE MG/DL
ALP SERPL-CCNC: 67 U/L (ref 40–150)
ALT SERPL W P-5'-P-CCNC: 21 U/L (ref 0–70)
ANION GAP SERPL CALCULATED.3IONS-SCNC: 6 MMOL/L (ref 3–14)
APPEARANCE UR: CLEAR
AST SERPL W P-5'-P-CCNC: 12 U/L (ref 0–45)
BILIRUB DIRECT SERPL-MCNC: 0.2 MG/DL (ref 0–0.2)
BILIRUB SERPL-MCNC: 0.8 MG/DL (ref 0.2–1.3)
BILIRUB UR QL STRIP: NEGATIVE
BUN SERPL-MCNC: 10 MG/DL (ref 7–30)
CALCIUM SERPL-MCNC: 8.7 MG/DL (ref 8.5–10.1)
CHLORIDE BLD-SCNC: 107 MMOL/L (ref 94–109)
CO2 SERPL-SCNC: 28 MMOL/L (ref 20–32)
COLOR UR AUTO: YELLOW
CREAT SERPL-MCNC: 0.77 MG/DL (ref 0.66–1.25)
CRP SERPL-MCNC: <2.9 MG/L (ref 0–8)
ERYTHROCYTE [DISTWIDTH] IN BLOOD BY AUTOMATED COUNT: 13 % (ref 10–15)
ERYTHROCYTE [SEDIMENTATION RATE] IN BLOOD BY WESTERGREN METHOD: 5 MM/HR (ref 0–15)
GFR SERPL CREATININE-BSD FRML MDRD: >90 ML/MIN/1.73M2
GLUCOSE BLD-MCNC: 93 MG/DL (ref 70–99)
GLUCOSE UR STRIP-MCNC: NEGATIVE MG/DL
HCT VFR BLD AUTO: 42.9 % (ref 40–53)
HCV AB SERPL QL IA: NONREACTIVE
HGB BLD-MCNC: 14.1 G/DL (ref 13.3–17.7)
HGB UR QL STRIP: NEGATIVE
HIV 1+2 AB+HIV1 P24 AG SERPL QL IA: NONREACTIVE
KETONES UR STRIP-MCNC: NEGATIVE MG/DL
LEUKOCYTE ESTERASE UR QL STRIP: NEGATIVE
MCH RBC QN AUTO: 31.8 PG (ref 26.5–33)
MCHC RBC AUTO-ENTMCNC: 32.9 G/DL (ref 31.5–36.5)
MCV RBC AUTO: 97 FL (ref 78–100)
NITRATE UR QL: NEGATIVE
PH UR STRIP: 6 [PH] (ref 5–7)
PLATELET # BLD AUTO: 260 10E3/UL (ref 150–450)
POTASSIUM BLD-SCNC: 4.1 MMOL/L (ref 3.4–5.3)
PROT SERPL-MCNC: 7.2 G/DL (ref 6.8–8.8)
RBC # BLD AUTO: 4.43 10E6/UL (ref 4.4–5.9)
SODIUM SERPL-SCNC: 141 MMOL/L (ref 133–144)
SP GR UR STRIP: 1.01 (ref 1–1.03)
TSH SERPL DL<=0.005 MIU/L-ACNC: 1.38 MU/L (ref 0.4–4)
UROBILINOGEN UR STRIP-ACNC: 0.2 E.U./DL
WBC # BLD AUTO: 6 10E3/UL (ref 4–11)

## 2022-08-10 PROCEDURE — 81003 URINALYSIS AUTO W/O SCOPE: CPT | Performed by: FAMILY MEDICINE

## 2022-08-10 PROCEDURE — 36415 COLL VENOUS BLD VENIPUNCTURE: CPT | Performed by: FAMILY MEDICINE

## 2022-08-10 PROCEDURE — 86140 C-REACTIVE PROTEIN: CPT | Performed by: FAMILY MEDICINE

## 2022-08-10 PROCEDURE — 85027 COMPLETE CBC AUTOMATED: CPT | Performed by: FAMILY MEDICINE

## 2022-08-10 PROCEDURE — 90715 TDAP VACCINE 7 YRS/> IM: CPT | Performed by: FAMILY MEDICINE

## 2022-08-10 PROCEDURE — 84443 ASSAY THYROID STIM HORMONE: CPT | Performed by: FAMILY MEDICINE

## 2022-08-10 PROCEDURE — 90471 IMMUNIZATION ADMIN: CPT | Performed by: FAMILY MEDICINE

## 2022-08-10 PROCEDURE — 86803 HEPATITIS C AB TEST: CPT | Performed by: FAMILY MEDICINE

## 2022-08-10 PROCEDURE — 99214 OFFICE O/P EST MOD 30 MIN: CPT | Mod: 25 | Performed by: FAMILY MEDICINE

## 2022-08-10 PROCEDURE — 87389 HIV-1 AG W/HIV-1&-2 AB AG IA: CPT | Performed by: FAMILY MEDICINE

## 2022-08-10 PROCEDURE — 82248 BILIRUBIN DIRECT: CPT | Performed by: FAMILY MEDICINE

## 2022-08-10 PROCEDURE — 85652 RBC SED RATE AUTOMATED: CPT | Performed by: FAMILY MEDICINE

## 2022-08-10 PROCEDURE — 80053 COMPREHEN METABOLIC PANEL: CPT | Performed by: FAMILY MEDICINE

## 2022-08-10 ASSESSMENT — PAIN SCALES - GENERAL: PAINLEVEL: NO PAIN (0)

## 2022-08-10 NOTE — PATIENT INSTRUCTIONS
Blood and urine studies today.     Stool study  and drop off.     Next steps pending lab workup.     Follow up in 1 month.

## 2022-08-10 NOTE — PROGRESS NOTES
Assessment & Plan     Unexplained weight loss  Will start workup as below. Reports having loss/change of taste with COVID, but reports eating the same. Has had night sweats for about a year which started after having COVID.  -- Follow up in 1 month for continued evaluation and cares.    - CBC with platelets  - Basic metabolic panel  (Ca, Cl, CO2, Creat, Gluc, K, Na, BUN)  - Hepatic panel (Albumin, ALT, AST, Bili, Alk Phos, TP)  - UA Macro with Reflex to Micro and Culture - lab collect  - TSH with free T4 reflex  - Fecal colorectal cancer screen (FIT)  - ESR: Erythrocyte sedimentation rate  - CRP, inflammation  - HIV Antigen Antibody Combo  - Hepatitis C Screen Reflex to HCV RNA Quant and Genotype  - XR Chest 2 Views  - US Head Neck Soft Tissue    Lymphadenopathy  Cervical chain palpable lymph nodes. Check US.   - US Head Neck Soft Tissue    The risks, benefits and treatment options of prescribed medications or other treatments have been discussed with the patient. The patient verbalized their understanding and should call or follow up if no improvement or if they develop further problems.    Follow up in 1 month.       Russ Chambers, [facility]    Ricky Armando is a 42 year old, presenting for the following health issues:  Weight Loss      History of Present Illness       Reason for visit:  Physical    He eats 2-3 servings of fruits and vegetables daily.He consumes 3 sweetened beverage(s) daily.He exercises with enough effort to increase his heart rate 60 or more minutes per day.  He exercises with enough effort to increase his heart rate 7 days per week.   He is taking medications regularly.     42 year old male who presents to clinic for unexplained weight loss.     Feels like he is eating the same.   No changes in exercise.   Active at work.     Has lost 18 lbs over the last 4 months.   No blood in stool or urine.   No pain anywhere  Has night sweats, for the past year since  "having COVID. Reports always being a sweaty person at night but has been worse since COVID.   No fevers or chills.   No family history of colon cancer.     No new medications or supplements.   Reports losing his sense of taste after having COVID about a year ago and reports nothing really taste good to him, but hasn't changed his diet.   Reports eating fast food 5 times per week.     Work has been more stressful recently with the economy and gas prices.       Review of Systems   Constitutional, HEENT, cardiovascular, pulmonary, gi and gu systems are negative, except as otherwise noted.      Objective    /70 (BP Location: Right arm, Patient Position: Chair, Cuff Size: Adult Regular)   Pulse 70   Temp 96.8  F (36  C) (Tympanic)   Resp 16   Ht 1.778 m (5' 10\")   Wt 66.2 kg (146 lb)   SpO2 98%   BMI 20.95 kg/m    Body mass index is 20.95 kg/m .  Physical Exam   General: Alert, cooperative, no acute distress  Head: Normocephalic, atraumatic   Eyes: PERRL, no scleral icterus, no conjunctival injection   Ears: External ear without deformity, external canals patent, TM intact with no signs of infection   Nose: nares patent  Throat: Oropharynx clear, non-erythematous, tonsils non-enlarged   Neck: supple, palpable cervical lymph nodes on the left chain. Submandibular glands slightly enlarged with palpation.   CV: RRR, no murmur   Lungs: Clear, non-labored breathing, No rales or rhonchi   Abdomen: Bowel sounds active, soft, non-tender, no guarding.   Extremities: No peripheral edema, calves non-tender   MSK: strength intact in upper and lower extremities. Gait normal.   Neuro: CN II-XII grossly intact. No focal deficits. Sensation intact.       .  ..  "

## 2022-08-22 ENCOUNTER — HOSPITAL ENCOUNTER (OUTPATIENT)
Dept: ULTRASOUND IMAGING | Facility: CLINIC | Age: 42
Discharge: HOME | End: 2022-08-22
Attending: FAMILY MEDICINE | Admitting: FAMILY MEDICINE
Payer: COMMERCIAL

## 2022-08-22 DIAGNOSIS — R63.4 UNEXPLAINED WEIGHT LOSS: ICD-10-CM

## 2022-08-22 DIAGNOSIS — R59.1 LYMPHADENOPATHY: ICD-10-CM

## 2022-08-22 PROCEDURE — 76536 US EXAM OF HEAD AND NECK: CPT

## 2022-09-24 ENCOUNTER — HEALTH MAINTENANCE LETTER (OUTPATIENT)
Age: 42
End: 2022-09-24

## 2022-12-16 ENCOUNTER — OFFICE VISIT (OUTPATIENT)
Dept: FAMILY MEDICINE | Facility: CLINIC | Age: 42
End: 2022-12-16
Payer: COMMERCIAL

## 2022-12-16 VITALS
OXYGEN SATURATION: 96 % | SYSTOLIC BLOOD PRESSURE: 110 MMHG | HEIGHT: 70 IN | WEIGHT: 147 LBS | BODY MASS INDEX: 21.05 KG/M2 | RESPIRATION RATE: 18 BRPM | HEART RATE: 86 BPM | DIASTOLIC BLOOD PRESSURE: 62 MMHG | TEMPERATURE: 97.2 F

## 2022-12-16 DIAGNOSIS — R63.4 UNINTENTIONAL WEIGHT LOSS: Primary | ICD-10-CM

## 2022-12-16 DIAGNOSIS — R61 NIGHT SWEATS: ICD-10-CM

## 2022-12-16 PROCEDURE — 36415 COLL VENOUS BLD VENIPUNCTURE: CPT | Performed by: FAMILY MEDICINE

## 2022-12-16 PROCEDURE — 99000 SPECIMEN HANDLING OFFICE-LAB: CPT | Performed by: FAMILY MEDICINE

## 2022-12-16 PROCEDURE — 86769 SARS-COV-2 COVID-19 ANTIBODY: CPT | Mod: 90 | Performed by: FAMILY MEDICINE

## 2022-12-16 PROCEDURE — 99214 OFFICE O/P EST MOD 30 MIN: CPT | Performed by: FAMILY MEDICINE

## 2022-12-16 ASSESSMENT — PAIN SCALES - GENERAL: PAINLEVEL: NO PAIN (0)

## 2022-12-16 NOTE — PROGRESS NOTES
Assessment & Plan     Unintentional weight loss  Night sweats  --Recent lab work, chest x-ray unremarkable for weight loss.  He states he continues to have night sweats a couple times per week which is drenching the bed.  --His diet is poor and will skip breakfast and occasionally miss dinner.  For lunch he eats fast food.  Weight has remained stable since 4 months ago.  --Discussed with patient as he continues to have night sweats we should further evaluate this with a CT of chest abdomen and pelvis.  --We will check a fit test to ensure no blood in the stool.  He wishes to be tested to see if he had COVID as he feels that his symptoms all started after an infection in June 2021.  --If CT chest abdomen pelvis is unremarkable proceed with echocardiogram.  If that is unremarkable refer to hematology for discussions and consideration of bone marrow biopsy.  --All questions answered.  Patient agreed with plan.  - Fecal colorectal cancer screen (FIT)  - COVID-19 Brayan RBD Amara & Titer Reflex  - CT Chest/Abdomen/Pelvis w Contrast    The risks, benefits and treatment options of prescribed medications or other treatments have been discussed with the patient. The patient verbalized their understanding and should call or follow up if no improvement or if they develop further problems.      Russ Chambers, [facility]    Ricky Armando is a 42 year old, presenting for the following health issues:  RECHECK      History of Present Illness       Reason for visit:  Follow up    He eats 2-3 servings of fruits and vegetables daily.He consumes 3 sweetened beverage(s) daily.He exercises with enough effort to increase his heart rate 60 or more minutes per day.  He exercises with enough effort to increase his heart rate 7 days per week.   He is taking medications regularly.     42 year old male who presents to clinic for weight loss follow up. Missed previous appt on 11/14/2022  Patient with prior  "work-up including CBC, BMP, hepatic panel, TSH, CRP, ESR, UA, HIV, hepatitis C, CXR  which all returned unremarkable.    Weight is 147 lbs today, on 8/10/2022 weight was 146 lbs.     Diet: doesn't eat breakfast, will eat fast food for lunch ( pizza, [employer],)   Will eat dinner most nights, will occasionally have a small dinner about once per week.   Very busy with work.     Will continue to have episodes of night sweats a couple times per week. Reports the bed will be wet. Reports since having COVID in June of 201 he has had night sweats since, however, less frequently as initially.   No blood in the stool or urine.   No fevers.   No alcohol use.   Uses E-cig.       Review of Systems   Constitutional, HEENT, cardiovascular, pulmonary, gi and gu systems are negative, except as otherwise noted.      Objective    /62 (BP Location: Left arm, Patient Position: Chair, Cuff Size: Adult Regular)   Pulse 86   Temp 97.2  F (36.2  C) (Tympanic)   Resp 18   Ht 1.772 m (5' 9.75\")   Wt 66.7 kg (147 lb)   SpO2 96%   BMI 21.24 kg/m    Body mass index is 21.24 kg/m .  Physical Exam   General: alert, cooperative, no acute distress   CV: RRR, no murmur  Resp: non-labored breathing, clear to auscultation, no wheezing or rales   Abdomen: Soft, non-tender, no guarding.   Extremities: No peripheral edema, calves non-tender.   Lymph: no lymphadenopathy noted on exam.         "

## 2022-12-16 NOTE — PATIENT INSTRUCTIONS
Good news weight is stable.     For further evaluation of the night sweats will proceed with CT scan.    If normal, next steps ECHO of heart. If testing negative will refer to Hematology for consideration of bone marrow biopsy.

## 2022-12-17 LAB
SARS-COV-2 AB SERPL IA-ACNC: 32 U/ML
SARS-COV-2 AB SERPL QL IA: POSITIVE

## 2022-12-27 ENCOUNTER — HOSPITAL ENCOUNTER (OUTPATIENT)
Dept: CT IMAGING | Facility: CLINIC | Age: 42
Discharge: HOME | End: 2022-12-27
Attending: FAMILY MEDICINE | Admitting: FAMILY MEDICINE
Payer: COMMERCIAL

## 2022-12-27 DIAGNOSIS — R63.4 UNINTENTIONAL WEIGHT LOSS: ICD-10-CM

## 2022-12-27 PROCEDURE — 250N000009 HC RX 250: Performed by: RADIOLOGY

## 2022-12-27 PROCEDURE — 74177 CT ABD & PELVIS W/CONTRAST: CPT

## 2022-12-27 PROCEDURE — 250N000011 HC RX IP 250 OP 636: Performed by: RADIOLOGY

## 2022-12-27 RX ORDER — IOPAMIDOL 755 MG/ML
64 INJECTION, SOLUTION INTRAVASCULAR ONCE
Status: COMPLETED | OUTPATIENT
Start: 2022-12-27 | End: 2022-12-27

## 2022-12-27 RX ADMIN — IOPAMIDOL 64 ML: 755 INJECTION, SOLUTION INTRAVENOUS at 10:00

## 2022-12-27 RX ADMIN — SODIUM CHLORIDE 58 ML: 9 INJECTION, SOLUTION INTRAVENOUS at 10:00

## 2022-12-27 NOTE — RESULT ENCOUNTER NOTE
Covering for primary/ordering provider;  RN to triage    The CT is normal except for possibly inflammation/scarring, or infection in the bases of both lungs.  RN to triage - any cough, chest pain, shortness of breath, fevers?  CT was ordered for 1 year of night sweats and wt loss.  Any new/other symptoms outside of this?

## 2022-12-28 DIAGNOSIS — R05.1 ACUTE COUGH: Primary | ICD-10-CM

## 2022-12-28 RX ORDER — AZITHROMYCIN 250 MG/1
TABLET, FILM COATED ORAL
Qty: 6 TABLET | Refills: 0 | Status: SHIPPED | OUTPATIENT
Start: 2022-12-28 | End: 2023-01-02

## 2022-12-28 NOTE — RESULT ENCOUNTER NOTE
Given that he has a new cough the last few weeks, I will order an antibiotic - zpak.  Follow-up with PCP  at next available to determine next step in testing

## 2023-01-06 DIAGNOSIS — R91.8 LUNG INFILTRATE ON CT: Primary | ICD-10-CM

## 2023-01-06 DIAGNOSIS — R61 NIGHT SWEATS: ICD-10-CM

## 2023-01-06 NOTE — PROGRESS NOTES
Please call patient and assist with scheduling repeat Chest CT in 8-12 weeks and also help to schedule ECHO of heart for next steps.

## 2023-03-07 ENCOUNTER — HOSPITAL ENCOUNTER (OUTPATIENT)
Dept: CT IMAGING | Facility: CLINIC | Age: 43
Discharge: HOME | End: 2023-03-07
Attending: FAMILY MEDICINE | Admitting: FAMILY MEDICINE
Payer: COMMERCIAL

## 2023-03-07 DIAGNOSIS — R91.8 LUNG INFILTRATE ON CT: ICD-10-CM

## 2023-03-07 PROCEDURE — 250N000011 HC RX IP 250 OP 636: Performed by: FAMILY MEDICINE

## 2023-03-07 PROCEDURE — 250N000009 HC RX 250: Performed by: FAMILY MEDICINE

## 2023-03-07 PROCEDURE — 71260 CT THORAX DX C+: CPT

## 2023-03-07 RX ORDER — IOPAMIDOL 755 MG/ML
70 INJECTION, SOLUTION INTRAVASCULAR ONCE
Status: COMPLETED | OUTPATIENT
Start: 2023-03-07 | End: 2023-03-07

## 2023-03-07 RX ADMIN — SODIUM CHLORIDE 58 ML: 9 INJECTION, SOLUTION INTRAVENOUS at 11:08

## 2023-03-07 RX ADMIN — IOPAMIDOL 70 ML: 755 INJECTION, SOLUTION INTRAVENOUS at 11:07

## 2023-05-08 ENCOUNTER — HEALTH MAINTENANCE LETTER (OUTPATIENT)
Age: 43
End: 2023-05-08

## 2024-07-14 ENCOUNTER — HEALTH MAINTENANCE LETTER (OUTPATIENT)
Age: 44
End: 2024-07-14

## 2025-07-19 ENCOUNTER — HEALTH MAINTENANCE LETTER (OUTPATIENT)
Age: 45
End: 2025-07-19